# Patient Record
Sex: MALE | Race: WHITE | NOT HISPANIC OR LATINO | Employment: UNEMPLOYED | URBAN - METROPOLITAN AREA
[De-identification: names, ages, dates, MRNs, and addresses within clinical notes are randomized per-mention and may not be internally consistent; named-entity substitution may affect disease eponyms.]

---

## 2020-10-01 ENCOUNTER — OFFICE VISIT (OUTPATIENT)
Dept: FAMILY MEDICINE | Facility: CLINIC | Age: 40
End: 2020-10-01
Payer: MEDICAID

## 2020-10-01 VITALS
WEIGHT: 178.5 LBS | TEMPERATURE: 99 F | DIASTOLIC BLOOD PRESSURE: 76 MMHG | HEART RATE: 68 BPM | OXYGEN SATURATION: 98 % | BODY MASS INDEX: 27.05 KG/M2 | HEIGHT: 68 IN | SYSTOLIC BLOOD PRESSURE: 138 MMHG

## 2020-10-01 DIAGNOSIS — F25.9 CHRONIC SCHIZOAFFECTIVE DISORDER WITH ACUTE EXACERBATION: ICD-10-CM

## 2020-10-01 DIAGNOSIS — G24.02 DRUG INDUCED ACUTE DYSTONIA: ICD-10-CM

## 2020-10-01 DIAGNOSIS — F31.9 BIPOLAR AFFECTIVE DISORDER, REMISSION STATUS UNSPECIFIED: Primary | ICD-10-CM

## 2020-10-01 DIAGNOSIS — Z23 ENCOUNTER FOR IMMUNIZATION: ICD-10-CM

## 2020-10-01 DIAGNOSIS — F19.959 DRUG-INDUCED PSYCHOTIC DISORDER WITH COMPLICATION: ICD-10-CM

## 2020-10-01 DIAGNOSIS — F19.959 PSYCHOACTIVE SUBSTANCE-INDUCED PSYCHOSIS: ICD-10-CM

## 2020-10-01 PROCEDURE — 90686 IIV4 VACC NO PRSV 0.5 ML IM: CPT | Mod: S$GLB,,, | Performed by: NURSE PRACTITIONER

## 2020-10-01 PROCEDURE — 90686 FLU VACCINE (QUAD) GREATER THAN OR EQUAL TO 3YO PRESERVATIVE FREE IM: ICD-10-PCS | Mod: S$GLB,,, | Performed by: NURSE PRACTITIONER

## 2020-10-01 PROCEDURE — 90471 IMMUNIZATION ADMIN: CPT | Mod: S$GLB,,, | Performed by: NURSE PRACTITIONER

## 2020-10-01 PROCEDURE — 90471 FLU VACCINE (QUAD) GREATER THAN OR EQUAL TO 3YO PRESERVATIVE FREE IM: ICD-10-PCS | Mod: S$GLB,,, | Performed by: NURSE PRACTITIONER

## 2020-10-01 PROCEDURE — 99204 PR OFFICE/OUTPT VISIT, NEW, LEVL IV, 45-59 MIN: ICD-10-PCS | Mod: 25,S$GLB,, | Performed by: NURSE PRACTITIONER

## 2020-10-01 PROCEDURE — 99204 OFFICE O/P NEW MOD 45 MIN: CPT | Mod: 25,S$GLB,, | Performed by: NURSE PRACTITIONER

## 2020-10-01 RX ORDER — BUPROPION HYDROCHLORIDE 300 MG/1
300 TABLET ORAL DAILY
Qty: 30 TABLET | Refills: 5 | Status: SHIPPED | OUTPATIENT
Start: 2020-10-01 | End: 2020-12-14

## 2020-10-01 RX ORDER — ARIPIPRAZOLE 15 MG/1
5 TABLET ORAL DAILY
COMMUNITY
End: 2021-07-13

## 2020-10-01 RX ORDER — BUPROPION HYDROCHLORIDE 300 MG/1
300 TABLET ORAL DAILY
COMMUNITY
End: 2020-10-01 | Stop reason: SDUPTHER

## 2020-10-01 RX ORDER — GABAPENTIN 300 MG/1
600 CAPSULE ORAL 3 TIMES DAILY
COMMUNITY
End: 2022-12-13 | Stop reason: SDUPTHER

## 2020-10-01 RX ORDER — TRAZODONE HYDROCHLORIDE 100 MG/1
100 TABLET ORAL NIGHTLY
COMMUNITY
End: 2022-12-13 | Stop reason: SDUPTHER

## 2020-10-01 RX ORDER — ESCITALOPRAM OXALATE 20 MG/1
20 TABLET ORAL DAILY
COMMUNITY
End: 2021-07-13

## 2020-10-01 NOTE — PROGRESS NOTES
"Clinic Note  10/1/2020      Subjective:       Patient ID:  Filiberto is a 40 y.o. male being seen in office today to establish care.       Chief Complaint: Establish Care    Filiberto is here today with his mother at side to establish care. She is the primary historian during the visit.  On March 31, 2020 the patient presented to [facility] via EMS.  These records were provided,  reviewed, and discussed at this encounter.  He was picked up via EMS after receiving telephone reports indicating the patient was removing his clothing in the parking lot of [employer] on [address].  While in the emergency room it was reported the patient verbalized smoking "Mojo", taking 3 300 mg Wellbutrin XL, and an unknown amount of Xanax.  The patient was placed under a PEC commitment and kept on a 24 hr hold for overdosing on Wellbutrin.  Upon medical clearance, the patient admitted to [facility] for treatment.  His urine drug screen was positive for cocaine, benzodiazepines, and amphetamines.  He was then admitted to [facility] in [place], [place].  Since that time he has also been admitted to [facility] and was discharged on July 16, 2020.  His mother states he has not been the same since she picked him up.  He now has multiple spastic movements, tics, and is not compliant with taking medications.  She has found all of his pills hidden in his dresser drawer after he admitted he took them.  Meds prescribed at discharge from mental health facility include:  Abilify 15 mg, gabapentin 300 mg, Wellbutrin 300 mg XL, Lexapro 20 mg, and trazodone 100 mg.  Today mother states he only needs a refill on Wellbutrin.    She admits he has had a substance abuse problem since he was 18 years old and has been in and out of various treatment and mental health facilities.  Prior to his ER admission in March he was in a sobriety home and working independently, but she feels he was still getting drugs in this home.  " Now he is living with her and she is trying to get him reestablished with psych and mental health care.  He does not drive but he is fully communicative, dresses, and does ADLs independently.     Wellness labs done in April a [facility] provided for review at this visit:  CBC, lipids, A1C all WNL.  Pt is agreeable to Flu shot today      Family History   Problem Relation Age of Onset    Breast cancer Mother     Depression Mother     Alcohol abuse Father      Social History     Socioeconomic History    Marital status: Single     Spouse name: Not on file    Number of children: Not on file    Years of education: Not on file    Highest education level: Not on file   Occupational History    Not on file   Social Needs    Financial resource strain: Not on file    Food insecurity     Worry: Not on file     Inability: Not on file    Transportation needs     Medical: Not on file     Non-medical: Not on file   Tobacco Use    Smoking status: Former Smoker     Types: Cigars    Smokeless tobacco: Never Used   Substance and Sexual Activity    Alcohol use: Never     Frequency: Never    Drug use: Never    Sexual activity: Not on file   Lifestyle    Physical activity     Days per week: Not on file     Minutes per session: Not on file    Stress: Not on file   Relationships    Social connections     Talks on phone: Not on file     Gets together: Not on file     Attends [employer] service: Not on file     Active member of club or organization: Not on file     Attends meetings of clubs or organizations: Not on file     Relationship status: Not on file   Other Topics Concern    Not on file   Social History Narrative    Not on file     Past Surgical History:   Procedure Laterality Date    HERNIA REPAIR       Social History     Substance and Sexual Activity   Alcohol Use Never    Frequency: Never     Patient has no known allergies.  Medication List with Changes/Refills   Current Medications     "ARIPIPRAZOLE (ABILIFY) 15 MG TAB    Take 5 mg by mouth once daily.    ESCITALOPRAM OXALATE (LEXAPRO) 20 MG TABLET    Take 20 mg by mouth once daily.    GABAPENTIN (NEURONTIN) 300 MG CAPSULE    Take 300 mg by mouth 3 (three) times daily.    TRAZODONE (DESYREL) 100 MG TABLET    Take 100 mg by mouth every evening.   Changed and/or Refilled Medications    Modified Medication Previous Medication    BUPROPION (WELLBUTRIN XL) 300 MG 24 HR TABLET buPROPion (WELLBUTRIN XL) 300 MG 24 hr tablet       Take 1 tablet (300 mg total) by mouth once daily.    Take 300 mg by mouth once daily.       Review of Systems   Constitutional: Negative for appetite change, diaphoresis, fatigue and fever.   HENT: Negative for congestion, ear pain, postnasal drip, rhinorrhea, sinus pressure, sinus pain and sore throat.    Respiratory: Negative for cough, shortness of breath, wheezing and stridor.    Cardiovascular: Negative for chest pain, palpitations and leg swelling.   Gastrointestinal: Negative for abdominal pain, blood in stool, constipation, diarrhea, nausea and vomiting.   Genitourinary: Negative for flank pain, frequency, hematuria and urgency.   Musculoskeletal: Negative for gait problem, joint swelling and myalgias.   Skin: Negative for color change and rash.   Neurological: Negative for dizziness, tremors, seizures, speech difficulty, weakness, numbness and headaches.   Psychiatric/Behavioral: Positive for agitation, behavioral problems, confusion and hallucinations. Negative for decreased concentration, dysphoric mood, self-injury, sleep disturbance and suicidal ideas. The patient is not nervous/anxious.               /76 (BP Location: Right arm, Patient Position: Sitting, BP Method: Large (Manual))   Pulse 68   Temp 98.6 °F (37 °C) (Oral)   Ht 5' 8" (1.727 m)   Wt 81 kg (178 lb 8 oz)   SpO2 98%   BMI 27.14 kg/m²   Estimated body mass index is 27.14 kg/m² as calculated from the following:    Height as of this encounter: 5' " "8" (1.727 m).    Weight as of this encounter: 81 kg (178 lb 8 oz).    Objective:        Physical Exam  Vitals signs and nursing note reviewed.   Constitutional:       Appearance: He is well-developed.   HENT:      Head: Normocephalic and atraumatic.      Nose: Nose normal.      Mouth/Throat:      Pharynx: Uvula midline. No oropharyngeal exudate or posterior oropharyngeal erythema.   Eyes:      Conjunctiva/sclera: Conjunctivae normal.      Pupils: Pupils are equal, round, and reactive to light.   Neck:      Musculoskeletal: Full passive range of motion without pain, normal range of motion and neck supple.      Thyroid: No thyroid mass or thyromegaly.      Vascular: No carotid bruit or JVD.      Trachea: Trachea normal.   Cardiovascular:      Rate and Rhythm: Normal rate and regular rhythm.      Heart sounds: No murmur. No friction rub. No gallop.    Pulmonary:      Effort: Pulmonary effort is normal. No respiratory distress.      Breath sounds: Normal breath sounds. No stridor. No wheezing, rhonchi or rales.   Abdominal:      General: Bowel sounds are normal. There is no distension or abdominal bruit.      Palpations: Abdomen is soft. There is no mass.      Tenderness: There is no abdominal tenderness.   Musculoskeletal: Normal range of motion.   Lymphadenopathy:      Cervical: No cervical adenopathy.   Skin:     General: Skin is warm and dry.      Capillary Refill: Capillary refill takes less than 2 seconds.      Findings: No rash.   Neurological:      Mental Status: He is alert. He is disoriented and confused.      Cranial Nerves: No cranial nerve deficit.      Sensory: No sensory deficit.      Gait: Gait is intact.      Comments: Severe dystonia and multiple uncontrollable tics throughout the visit.     Psychiatric:         Attention and Perception: He is inattentive. He does not perceive visual hallucinations.         Mood and Affect: Mood and affect normal. Mood is not anxious or depressed.         Speech: " Speech normal.         Behavior: Behavior normal. Behavior is cooperative.         Thought Content: Thought content normal. Thought content does not include suicidal ideation. Thought content does not include homicidal or suicidal plan.         Cognition and Memory: Cognition is impaired. Memory is impaired. He exhibits impaired recent memory and impaired remote memory.         Judgment: Judgment is impulsive.            Assessment and Plan:         Evaristo was seen today for establish care.    Diagnoses and all orders for this visit:    Bipolar affective disorder, remission status unspecified  -     Ambulatory referral/consult to Psychiatry; Future  -     buPROPion (WELLBUTRIN XL) 300 MG 24 hr tablet; Take 1 tablet (300 mg total) by mouth once daily.    Chronic schizoaffective disorder with acute exacerbation  -     Ambulatory referral/consult to Psychiatry; Future    Drug-induced psychotic disorder with complication  -     Ambulatory referral/consult to Psychiatry; Future    Drug induced acute dystonia  -     Ambulatory referral/consult to Neurology; Future    Psychoactive substance-induced psychosis  -     Ambulatory referral/consult to Neurology; Future    Encounter for immunization  -     Influenza - Quadrivalent *Preferred* (6 months+) (PF)    During the visit Filiberto's speech is normal in rate, volume, and he is coherent.  Language skills are intact.  He is fully communicative and casually dressed.  His mood is entirely normal with minimal signs of depression or mood elevation.  At today's visit he denies any delusions, bizarre behaviors, or other indicators of psychotic process.  He does have severe dystonia and multiple uncontrollable tics throughout the visit.  Mother is agreeable to re-establish care with Psych and Neuro referral. Pt was informed if they have not received a phone call from provider referred to in one week to call office so that we can follow-up.  Patient and mother verbalized understanding  and agreed to treatment and plan of care.      Follow up:   Follow up in about 6 months (around 4/1/2021), or sooner if needed.            Paz Garcia NP

## 2020-12-14 ENCOUNTER — TELEPHONE (OUTPATIENT)
Dept: FAMILY MEDICINE | Facility: CLINIC | Age: 40
End: 2020-12-14

## 2020-12-14 NOTE — TELEPHONE ENCOUNTER
----- Message from Bing Mccarthy sent at 12/14/2020 10:51 AM CST -----  Regarding: patient prescription  Contact: [facility]  Would like to consult with nurse in regards to patient's Wellbutrin. Pharmacy is stating that a patient's family member wanted to stop the medication for patient being that they feel as if patient is abusing the medication. Pharmacy would like to know if they need to discontinue the medication. Please call back at [phone]

## 2020-12-22 ENCOUNTER — TELEPHONE (OUTPATIENT)
Dept: FAMILY MEDICINE | Facility: CLINIC | Age: 40
End: 2020-12-22
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
## 2021-01-05 ENCOUNTER — OFFICE VISIT (OUTPATIENT)
Dept: FAMILY MEDICINE | Facility: CLINIC | Age: 41
End: 2021-01-05
Payer: MEDICAID

## 2021-01-05 VITALS
DIASTOLIC BLOOD PRESSURE: 75 MMHG | BODY MASS INDEX: 25.61 KG/M2 | WEIGHT: 169 LBS | HEART RATE: 69 BPM | SYSTOLIC BLOOD PRESSURE: 129 MMHG | HEIGHT: 68 IN | OXYGEN SATURATION: 98 %

## 2021-01-05 DIAGNOSIS — F19.10 SUBSTANCE ABUSE: Primary | ICD-10-CM

## 2021-01-05 PROCEDURE — 99212 OFFICE O/P EST SF 10 MIN: CPT | Mod: S$GLB,,, | Performed by: FAMILY MEDICINE

## 2021-01-05 PROCEDURE — 99212 PR OFFICE/OUTPT VISIT, EST, LEVL II, 10-19 MIN: ICD-10-PCS | Mod: S$GLB,,, | Performed by: FAMILY MEDICINE

## 2021-01-05 RX ORDER — LITHIUM CARBONATE 300 MG/1
300 CAPSULE ORAL
COMMUNITY
End: 2021-07-13

## 2021-07-13 ENCOUNTER — OFFICE VISIT (OUTPATIENT)
Dept: FAMILY MEDICINE | Facility: CLINIC | Age: 41
End: 2021-07-13
Payer: MEDICAID

## 2021-07-13 VITALS
TEMPERATURE: 99 F | BODY MASS INDEX: 27.01 KG/M2 | HEART RATE: 74 BPM | RESPIRATION RATE: 18 BRPM | DIASTOLIC BLOOD PRESSURE: 91 MMHG | OXYGEN SATURATION: 97 % | WEIGHT: 178.19 LBS | HEIGHT: 68 IN | SYSTOLIC BLOOD PRESSURE: 123 MMHG

## 2021-07-13 DIAGNOSIS — Z13.29 SCREENING FOR ENDOCRINE, NUTRITIONAL, METABOLIC AND IMMUNITY DISORDER: ICD-10-CM

## 2021-07-13 DIAGNOSIS — Z13.228 SCREENING FOR ENDOCRINE, NUTRITIONAL, METABOLIC AND IMMUNITY DISORDER: ICD-10-CM

## 2021-07-13 DIAGNOSIS — Z13.0 SCREENING FOR ENDOCRINE, NUTRITIONAL, METABOLIC AND IMMUNITY DISORDER: ICD-10-CM

## 2021-07-13 DIAGNOSIS — Z00.00 ROUTINE ADULT HEALTH MAINTENANCE: ICD-10-CM

## 2021-07-13 DIAGNOSIS — Z76.89 ENCOUNTER TO ESTABLISH CARE: Primary | ICD-10-CM

## 2021-07-13 DIAGNOSIS — Z13.21 SCREENING FOR ENDOCRINE, NUTRITIONAL, METABOLIC AND IMMUNITY DISORDER: ICD-10-CM

## 2021-07-13 DIAGNOSIS — Z11.59 ENCOUNTER FOR SCREENING FOR OTHER VIRAL DISEASES: ICD-10-CM

## 2021-07-13 LAB
ABS NRBC COUNT: 0 X 10 3/UL (ref 0–0.01)
ABSOLUTE BASOPHIL: 0.07 X 10 3/UL (ref 0–0.22)
ABSOLUTE EOSINOPHIL: 0.33 X 10 3/UL (ref 0.04–0.54)
ABSOLUTE IMMATURE GRAN: 0.02 X 10 3/UL (ref 0–0.04)
ABSOLUTE LYMPHOCYTE: 1.78 X 10 3/UL (ref 0.86–4.75)
ABSOLUTE MONOCYTE: 0.59 X 10 3/UL (ref 0.22–1.08)
ALBUMIN SERPL-MCNC: 4.8 G/DL (ref 3.5–5.2)
ALBUMIN/GLOB SERPL ELPH: 1.7 {RATIO} (ref 1–2.7)
ALP ISOS SERPL LEV INH-CCNC: 55 U/L (ref 40–130)
ALT (SGPT): 11 U/L (ref 0–41)
ANION GAP SERPL CALC-SCNC: 9 MMOL/L (ref 8–17)
AST SERPL-CCNC: 15 U/L (ref 0–40)
BASOPHILS NFR BLD: 1 % (ref 0.2–1.2)
BILIRUBIN, TOTAL: 0.21 MG/DL (ref 0–1.2)
BUN/CREAT SERPL: 12.9 (ref 6–20)
CALCIUM SERPL-MCNC: 10.1 MG/DL (ref 8.6–10.2)
CARBON DIOXIDE, CO2: 29 MMOL/L (ref 22–29)
CHLORIDE: 103 MMOL/L (ref 98–107)
CHOLEST SERPL-MSCNC: 206 MG/DL (ref 100–200)
CREAT SERPL-MCNC: 1.25 MG/DL (ref 0.7–1.2)
EOSINOPHIL NFR BLD: 4.7 % (ref 0.7–7)
GFR ESTIMATION: 64.3
GLOBULIN: 2.9 G/DL (ref 1.5–4.5)
GLUCOSE: 92 MG/DL (ref 74–106)
HCT VFR BLD AUTO: 50.8 % (ref 42–52)
HCV IGG SERPL QL IA: NONREACTIVE
HDLC SERPL-MCNC: 57 MG/DL
HGB BLD-MCNC: 18.1 G/DL (ref 14–18)
HIV 1+2 AB+HIV1 P24 AG SERPL QL IA: NONREACTIVE
IMMATURE GRANULOCYTES: 0.3 % (ref 0–0.5)
LDL/HDL RATIO: 2.4 (ref 1–3)
LDLC SERPL CALC-MCNC: 135.2 MG/DL (ref 0–100)
LYMPHOCYTES NFR BLD: 25.3 % (ref 19.3–53.1)
MCH RBC QN AUTO: 30.7 PG (ref 27–32)
MCHC RBC AUTO-ENTMCNC: 35.6 G/DL (ref 32–36)
MCV RBC AUTO: 86.1 FL (ref 80–94)
MONOCYTES NFR BLD: 8.4 % (ref 4.7–12.5)
NEUTROPHILS # BLD AUTO: 4.25 X 10 3/UL (ref 2.15–7.56)
NEUTROPHILS NFR BLD: 60.3 % (ref 34–71.1)
NUCLEATED RED BLOOD CELLS: 0 /100 WBC (ref 0–0.2)
PLATELET # BLD AUTO: 267 X 10 3/UL (ref 135–400)
POTASSIUM: 4.8 MMOL/L (ref 3.5–5.1)
PROT SNV-MCNC: 7.7 G/DL (ref 6.4–8.3)
RBC # BLD AUTO: 5.9 X 10 6/UL (ref 4.7–6.1)
RDW-SD: 35.8 FL (ref 37–54)
SODIUM: 141 MMOL/L (ref 136–145)
TRIGL SERPL-MCNC: 69 MG/DL (ref 0–150)
TSH SERPL DL<=0.005 MIU/L-ACNC: 1.78 UIU/ML (ref 0.27–4.2)
UREA NITROGEN (BUN): 16.1 MG/DL (ref 6–20)
WBC # BLD: 7.04 X 10 3/UL (ref 4.3–10.8)

## 2021-07-13 PROCEDURE — 99395 PREV VISIT EST AGE 18-39: CPT | Mod: S$GLB,,, | Performed by: OBSTETRICS & GYNECOLOGY

## 2021-07-13 PROCEDURE — 99395 PR PREVENTIVE VISIT,EST,18-39: ICD-10-PCS | Mod: S$GLB,,, | Performed by: OBSTETRICS & GYNECOLOGY

## 2021-07-13 RX ORDER — OLANZAPINE 20 MG/1
1.5 TABLET ORAL NIGHTLY
COMMUNITY
Start: 2021-05-26 | End: 2023-08-09

## 2021-07-13 RX ORDER — FLUPHENAZINE DECANOATE 25 MG/ML
50 INJECTION, SOLUTION INTRAMUSCULAR; SUBCUTANEOUS
COMMUNITY
End: 2023-08-09

## 2021-08-04 ENCOUNTER — TELEPHONE (OUTPATIENT)
Dept: FAMILY MEDICINE | Facility: CLINIC | Age: 41
End: 2021-08-04

## 2021-08-04 DIAGNOSIS — R79.89 ELEVATED SERUM CREATININE: Primary | ICD-10-CM

## 2021-08-13 ENCOUNTER — TELEPHONE (OUTPATIENT)
Dept: FAMILY MEDICINE | Facility: CLINIC | Age: 41
End: 2021-08-13

## 2021-11-09 ENCOUNTER — OFFICE VISIT (OUTPATIENT)
Dept: FAMILY MEDICINE | Facility: CLINIC | Age: 41
End: 2021-11-09
Payer: MEDICAID

## 2021-11-09 VITALS
DIASTOLIC BLOOD PRESSURE: 85 MMHG | HEART RATE: 78 BPM | TEMPERATURE: 98 F | HEIGHT: 68 IN | WEIGHT: 188.25 LBS | OXYGEN SATURATION: 98 % | SYSTOLIC BLOOD PRESSURE: 143 MMHG | BODY MASS INDEX: 28.53 KG/M2

## 2021-11-09 DIAGNOSIS — Z72.0 TOBACCO ABUSE: Primary | ICD-10-CM

## 2021-11-09 PROCEDURE — 90471 IMMUNIZATION ADMIN: CPT | Mod: S$GLB,,, | Performed by: OBSTETRICS & GYNECOLOGY

## 2021-11-09 PROCEDURE — 90686 FLU VACCINE (QUAD) GREATER THAN OR EQUAL TO 3YO PRESERVATIVE FREE IM: ICD-10-PCS | Mod: S$GLB,,, | Performed by: OBSTETRICS & GYNECOLOGY

## 2021-11-09 PROCEDURE — 90471 FLU VACCINE (QUAD) GREATER THAN OR EQUAL TO 3YO PRESERVATIVE FREE IM: ICD-10-PCS | Mod: S$GLB,,, | Performed by: OBSTETRICS & GYNECOLOGY

## 2021-11-09 PROCEDURE — 99213 OFFICE O/P EST LOW 20 MIN: CPT | Mod: 25,S$GLB,, | Performed by: OBSTETRICS & GYNECOLOGY

## 2021-11-09 PROCEDURE — 99213 PR OFFICE/OUTPT VISIT, EST, LEVL III, 20-29 MIN: ICD-10-PCS | Mod: 25,S$GLB,, | Performed by: OBSTETRICS & GYNECOLOGY

## 2021-11-09 PROCEDURE — 90686 IIV4 VACC NO PRSV 0.5 ML IM: CPT | Mod: S$GLB,,, | Performed by: OBSTETRICS & GYNECOLOGY

## 2021-11-09 RX ORDER — VARENICLINE TARTRATE 0.5 (11)-1
KIT ORAL
Qty: 1 EACH | Refills: 0 | Status: SHIPPED | OUTPATIENT
Start: 2021-11-09 | End: 2022-10-19

## 2021-11-10 ENCOUNTER — TELEPHONE (OUTPATIENT)
Dept: FAMILY MEDICINE | Facility: CLINIC | Age: 41
End: 2021-11-10
Payer: MEDICAID

## 2021-11-15 ENCOUNTER — TELEPHONE (OUTPATIENT)
Dept: FAMILY MEDICINE | Facility: CLINIC | Age: 41
End: 2021-11-15
Payer: MEDICAID

## 2022-01-14 ENCOUNTER — TELEPHONE (OUTPATIENT)
Dept: FAMILY MEDICINE | Facility: CLINIC | Age: 42
End: 2022-01-14
Payer: MEDICAID

## 2022-01-14 NOTE — TELEPHONE ENCOUNTER
Informed patients mom that she should come in to see Summer for her concerns with her son. Patient verbalized understanding.

## 2022-01-14 NOTE — TELEPHONE ENCOUNTER
----- Message from Miguel Gutiérrez sent at 1/14/2022 10:33 AM CST -----  Contact: Chacha patient mother  Chacha patient mother called regarding patient mental health. Chacha ask if she can get a call from someone at [phone]. Thanks

## 2022-01-14 NOTE — TELEPHONE ENCOUNTER
----- Message from Janel Kelly MA sent at 1/14/2022 10:38 AM CST -----  Contact: Chacha patient mother    ----- Message -----  From: Miguel Gutiérrez  Sent: 1/14/2022  10:35 AM CST  To: Randell [facility] Staff    Chacha patient mother called regarding patient mental health. Chacha ask if she can get a call from someone at [phone]. Thanks

## 2022-01-20 ENCOUNTER — OFFICE VISIT (OUTPATIENT)
Dept: FAMILY MEDICINE | Facility: CLINIC | Age: 42
End: 2022-01-20
Payer: MEDICAID

## 2022-01-20 VITALS
SYSTOLIC BLOOD PRESSURE: 121 MMHG | OXYGEN SATURATION: 98 % | BODY MASS INDEX: 27.74 KG/M2 | DIASTOLIC BLOOD PRESSURE: 80 MMHG | HEART RATE: 102 BPM | RESPIRATION RATE: 16 BRPM | HEIGHT: 68 IN | WEIGHT: 183 LBS

## 2022-01-20 DIAGNOSIS — Z09 HOSPITAL DISCHARGE FOLLOW-UP: Primary | ICD-10-CM

## 2022-01-20 PROCEDURE — 3008F BODY MASS INDEX DOCD: CPT | Mod: CPTII,S$GLB,, | Performed by: OBSTETRICS & GYNECOLOGY

## 2022-01-20 PROCEDURE — 3008F PR BODY MASS INDEX (BMI) DOCUMENTED: ICD-10-PCS | Mod: CPTII,S$GLB,, | Performed by: OBSTETRICS & GYNECOLOGY

## 2022-01-20 PROCEDURE — 99213 OFFICE O/P EST LOW 20 MIN: CPT | Mod: S$GLB,,, | Performed by: OBSTETRICS & GYNECOLOGY

## 2022-01-20 PROCEDURE — 1160F RVW MEDS BY RX/DR IN RCRD: CPT | Mod: CPTII,S$GLB,, | Performed by: OBSTETRICS & GYNECOLOGY

## 2022-01-20 PROCEDURE — 3074F SYST BP LT 130 MM HG: CPT | Mod: CPTII,S$GLB,, | Performed by: OBSTETRICS & GYNECOLOGY

## 2022-01-20 PROCEDURE — 3079F PR MOST RECENT DIASTOLIC BLOOD PRESSURE 80-89 MM HG: ICD-10-PCS | Mod: CPTII,S$GLB,, | Performed by: OBSTETRICS & GYNECOLOGY

## 2022-01-20 PROCEDURE — 1159F PR MEDICATION LIST DOCUMENTED IN MEDICAL RECORD: ICD-10-PCS | Mod: CPTII,S$GLB,, | Performed by: OBSTETRICS & GYNECOLOGY

## 2022-01-20 PROCEDURE — 99213 PR OFFICE/OUTPT VISIT, EST, LEVL III, 20-29 MIN: ICD-10-PCS | Mod: S$GLB,,, | Performed by: OBSTETRICS & GYNECOLOGY

## 2022-01-20 PROCEDURE — 1159F MED LIST DOCD IN RCRD: CPT | Mod: CPTII,S$GLB,, | Performed by: OBSTETRICS & GYNECOLOGY

## 2022-01-20 PROCEDURE — 3074F PR MOST RECENT SYSTOLIC BLOOD PRESSURE < 130 MM HG: ICD-10-PCS | Mod: CPTII,S$GLB,, | Performed by: OBSTETRICS & GYNECOLOGY

## 2022-01-20 PROCEDURE — 3079F DIAST BP 80-89 MM HG: CPT | Mod: CPTII,S$GLB,, | Performed by: OBSTETRICS & GYNECOLOGY

## 2022-01-20 PROCEDURE — 1160F PR REVIEW ALL MEDS BY PRESCRIBER/CLIN PHARMACIST DOCUMENTED: ICD-10-PCS | Mod: CPTII,S$GLB,, | Performed by: OBSTETRICS & GYNECOLOGY

## 2022-01-20 NOTE — PROGRESS NOTES
Subjective:   Patient ID: Evaristo Crooks is a 41 y.o. male who presents for evaluation today.    Chief Complaint:  Follow-up (D/C [facility] on Jan. 3rd. Very depressed lately. )    History of Present Illness  HPI   Went to ER on 12/27. Was admitted to [facility] for paranoia. Has a history of schizophrenia. Had medications adjusted and doing well for the most part. Sees psych regularly with in home visits. Accompanied by mother at today's appointment.     FAMILY HISTORY  Family History   Problem Relation Age of Onset    Breast cancer Mother     Depression Mother     Alcohol abuse Father      SOCIAL HISTORY  Social History     Tobacco Use   Smoking Status Former Smoker    Types: Cigars   Smokeless Tobacco Never Used   ,   Social History     Substance and Sexual Activity   Alcohol Use Never     MEDICATIONS  Outpatient Medications Marked as Taking for the 1/20/22 encounter (Office Visit) with Summer Mejias NP   Medication Sig Dispense Refill    fluPHENAZine decanoate (PROLIXIN) 25 mg/mL injection Inject 50 mg into the muscle every 30 days.      gabapentin (NEURONTIN) 300 MG capsule Take 600 mg by mouth 3 (three) times daily.      traZODone (DESYREL) 100 MG tablet Take 100 mg by mouth every evening.       Review of Systems   Review of Systems   Constitutional: Negative for activity change, appetite change, fever and unexpected weight change.   HENT: Negative for dental problem, hearing loss, sneezing, sore throat, trouble swallowing and voice change.    Eyes: Negative for visual disturbance.   Respiratory: Negative for cough, choking, chest tightness and shortness of breath.    Cardiovascular: Negative for chest pain, palpitations, leg swelling and claudication.   Gastrointestinal: Negative for abdominal pain, blood in stool, change in bowel habit, nausea, vomiting, fecal incontinence and change in bowel habit.   Endocrine: Negative for polydipsia, polyphagia and polyuria.   Genitourinary: Negative  "for decreased urine volume, dysuria and hematuria.   Musculoskeletal: Negative for gait problem, neck pain and neck stiffness.   Integumentary:  Negative for color change, pallor, rash, wound and mole/lesion.   Allergic/Immunologic: Negative for frequent infections.   Neurological: Negative for dizziness, vertigo, seizures, syncope, speech difficulty, disturbances in coordination and coordination difficulties.   Hematological: Negative for adenopathy. Does not bruise/bleed easily.   Psychiatric/Behavioral: Negative for behavioral problems, confusion, hallucinations, self-injury, sleep disturbance and suicidal ideas.         Objective:    VITALS  BP Readings from Last 1 Encounters:   01/20/22 121/80   Weight: 83 kg (183 lb)   Height: 5' 8" (172.7 cm)   BMI Readings from Last 1 Encounters:   01/20/22 27.83 kg/m²       Physical Exam  Vitals and nursing note reviewed.   Constitutional:       General: He is not in acute distress.     Appearance: Normal appearance. He is not ill-appearing, toxic-appearing or diaphoretic.   HENT:      Head: Normocephalic and atraumatic.      Right Ear: External ear normal.      Left Ear: External ear normal.      Nose: Nose normal.   Eyes:      General:         Right eye: No discharge.         Left eye: No discharge.   Neck:      Vascular: No carotid bruit.   Cardiovascular:      Rate and Rhythm: Normal rate and regular rhythm.      Heart sounds: Normal heart sounds. No murmur heard.  No friction rub. No gallop.    Pulmonary:      Effort: Pulmonary effort is normal. No respiratory distress.      Breath sounds: Normal breath sounds. No stridor. No wheezing, rhonchi or rales.   Chest:      Chest wall: No tenderness.   Musculoskeletal:         General: Normal range of motion.      Cervical back: Normal range of motion and neck supple.      Right lower leg: No edema.      Left lower leg: No edema.   Skin:     General: Skin is warm and dry.      Coloration: Skin is not jaundiced or pale. "   Neurological:      General: No focal deficit present.      Mental Status: He is alert and oriented to person, place, and time.      Gait: Gait normal.   Psychiatric:         Mood and Affect: Mood normal.         Behavior: Behavior normal.         Thought Content: Thought content normal.         Judgment: Judgment normal.         Assessment:      1. Hospital discharge follow-up       Plan:   Hospital discharge follow-up    Keep psych appointments. Follow up with psychiatrist for ongoing care and evaluation.     Patient instructed to contact the clinic should any questions or concerns arise prior to next office visit. Risks, benefits, and alternatives discussed with patient. Patient verbalized understanding of discussed plan of care. Asked patient if any further questions, answered no. Follow up as discussed or sooner PRN.

## 2022-03-29 ENCOUNTER — OFFICE VISIT (OUTPATIENT)
Dept: FAMILY MEDICINE | Facility: CLINIC | Age: 42
End: 2022-03-29
Payer: MEDICAID

## 2022-03-29 VITALS
TEMPERATURE: 98 F | OXYGEN SATURATION: 96 % | HEIGHT: 68 IN | BODY MASS INDEX: 26.98 KG/M2 | HEART RATE: 51 BPM | RESPIRATION RATE: 14 BRPM | DIASTOLIC BLOOD PRESSURE: 72 MMHG | WEIGHT: 178 LBS | SYSTOLIC BLOOD PRESSURE: 108 MMHG

## 2022-03-29 DIAGNOSIS — R79.89 ELEVATED SERUM CREATININE: ICD-10-CM

## 2022-03-29 DIAGNOSIS — E78.41 ELEVATED LIPOPROTEIN(A): Primary | ICD-10-CM

## 2022-03-29 PROCEDURE — 3074F PR MOST RECENT SYSTOLIC BLOOD PRESSURE < 130 MM HG: ICD-10-PCS | Mod: CPTII,S$GLB,, | Performed by: OBSTETRICS & GYNECOLOGY

## 2022-03-29 PROCEDURE — 3078F PR MOST RECENT DIASTOLIC BLOOD PRESSURE < 80 MM HG: ICD-10-PCS | Mod: CPTII,S$GLB,, | Performed by: OBSTETRICS & GYNECOLOGY

## 2022-03-29 PROCEDURE — 99213 PR OFFICE/OUTPT VISIT, EST, LEVL III, 20-29 MIN: ICD-10-PCS | Mod: S$GLB,,, | Performed by: OBSTETRICS & GYNECOLOGY

## 2022-03-29 PROCEDURE — 1159F MED LIST DOCD IN RCRD: CPT | Mod: CPTII,S$GLB,, | Performed by: OBSTETRICS & GYNECOLOGY

## 2022-03-29 PROCEDURE — 99213 OFFICE O/P EST LOW 20 MIN: CPT | Mod: S$GLB,,, | Performed by: OBSTETRICS & GYNECOLOGY

## 2022-03-29 PROCEDURE — 3078F DIAST BP <80 MM HG: CPT | Mod: CPTII,S$GLB,, | Performed by: OBSTETRICS & GYNECOLOGY

## 2022-03-29 PROCEDURE — 3008F BODY MASS INDEX DOCD: CPT | Mod: CPTII,S$GLB,, | Performed by: OBSTETRICS & GYNECOLOGY

## 2022-03-29 PROCEDURE — 1160F RVW MEDS BY RX/DR IN RCRD: CPT | Mod: CPTII,S$GLB,, | Performed by: OBSTETRICS & GYNECOLOGY

## 2022-03-29 PROCEDURE — 3008F PR BODY MASS INDEX (BMI) DOCUMENTED: ICD-10-PCS | Mod: CPTII,S$GLB,, | Performed by: OBSTETRICS & GYNECOLOGY

## 2022-03-29 PROCEDURE — 1160F PR REVIEW ALL MEDS BY PRESCRIBER/CLIN PHARMACIST DOCUMENTED: ICD-10-PCS | Mod: CPTII,S$GLB,, | Performed by: OBSTETRICS & GYNECOLOGY

## 2022-03-29 PROCEDURE — 3074F SYST BP LT 130 MM HG: CPT | Mod: CPTII,S$GLB,, | Performed by: OBSTETRICS & GYNECOLOGY

## 2022-03-29 PROCEDURE — 1159F PR MEDICATION LIST DOCUMENTED IN MEDICAL RECORD: ICD-10-PCS | Mod: CPTII,S$GLB,, | Performed by: OBSTETRICS & GYNECOLOGY

## 2022-03-29 RX ORDER — FLUPHENAZINE HYDROCHLORIDE 10 MG/1
TABLET ORAL
COMMUNITY
Start: 2022-01-03 | End: 2023-08-09

## 2022-03-29 RX ORDER — DULOXETIN HYDROCHLORIDE 30 MG/1
30 CAPSULE, DELAYED RELEASE ORAL DAILY
COMMUNITY
End: 2022-12-13 | Stop reason: SDUPTHER

## 2022-03-29 NOTE — PROGRESS NOTES
Subjective:   Patient ID: Evaristo Crooks is a 41 y.o. male who presents for evaluation today.    Chief Complaint:  Annual Exam      History of Present Illness  HPI   Patient presents today for wellness exam. He is accompanied by his mother, Chacha. He is going to schedule to receive the Covid vaccine. He is due for a repeat lipid panel. He denies any complaints or concerns today. He sees Karla Sr for psychiatric care.    FAMILY HISTORY  Family History   Problem Relation Age of Onset    Breast cancer Mother     Depression Mother     Alcohol abuse Father      SOCIAL HISTORY  Social History     Tobacco Use   Smoking Status Former Smoker    Types: Cigars   Smokeless Tobacco Never Used   ,   Social History     Substance and Sexual Activity   Alcohol Use Never     MEDICATIONS  Outpatient Medications Marked as Taking for the 3/29/22 encounter (Office Visit) with Summer Mejias NP   Medication Sig Dispense Refill    DULoxetine (CYMBALTA) 30 MG capsule Take 30 mg by mouth once daily.      fluphenazine (PROLIXIN) 10 MG tablet       fluPHENAZine decanoate (PROLIXIN) 25 mg/mL injection Inject 50 mg into the muscle every 30 days.      gabapentin (NEURONTIN) 300 MG capsule Take 600 mg by mouth 3 (three) times daily.      OLANZapine (ZYPREXA) 20 MG tablet Take 1.5 tablets by mouth every evening.      traZODone (DESYREL) 100 MG tablet Take 100 mg by mouth every evening.       Review of Systems   Review of Systems   Constitutional: Negative for activity change, appetite change, fever and unexpected weight change.   HENT: Negative for dental problem, hearing loss, sneezing, sore throat, trouble swallowing and voice change.    Eyes: Negative for visual disturbance.   Respiratory: Negative for cough, choking, chest tightness and shortness of breath.    Cardiovascular: Negative for chest pain, palpitations, leg swelling and claudication.   Gastrointestinal: Negative for abdominal pain, blood in stool, change in bowel  "habit, nausea, vomiting, fecal incontinence and change in bowel habit.   Endocrine: Negative for polydipsia, polyphagia and polyuria.   Genitourinary: Negative for decreased urine volume, dysuria and hematuria.   Musculoskeletal: Negative for gait problem, neck pain and neck stiffness.   Integumentary:  Negative for color change, pallor, rash, wound and mole/lesion.   Allergic/Immunologic: Negative for frequent infections.   Neurological: Negative for dizziness, vertigo, seizures, syncope, speech difficulty, disturbances in coordination and coordination difficulties.   Hematological: Negative for adenopathy. Does not bruise/bleed easily.   Psychiatric/Behavioral: Negative for behavioral problems, confusion, hallucinations, self-injury, sleep disturbance and suicidal ideas.         Objective:    VITALS  BP Readings from Last 1 Encounters:   03/29/22 108/72   Weight: 80.7 kg (178 lb)   Height: 5' 8" (172.7 cm)   BMI Readings from Last 1 Encounters:   03/29/22 27.06 kg/m²       Physical Exam  Vitals and nursing note reviewed.   Constitutional:       General: He is not in acute distress.     Appearance: Normal appearance. He is not ill-appearing, toxic-appearing or diaphoretic.   HENT:      Head: Normocephalic and atraumatic.      Right Ear: External ear normal.      Left Ear: External ear normal.      Nose: Nose normal.   Eyes:      General:         Right eye: No discharge.         Left eye: No discharge.   Neck:      Vascular: No carotid bruit.   Cardiovascular:      Rate and Rhythm: Normal rate and regular rhythm.      Heart sounds: Normal heart sounds. No murmur heard.    No friction rub. No gallop.   Pulmonary:      Effort: Pulmonary effort is normal. No respiratory distress.      Breath sounds: Normal breath sounds. No stridor. No wheezing, rhonchi or rales.   Chest:      Chest wall: No tenderness.   Musculoskeletal:         General: Normal range of motion.      Cervical back: Normal range of motion and neck " supple.      Right lower leg: No edema.      Left lower leg: No edema.   Skin:     General: Skin is warm and dry.      Coloration: Skin is not jaundiced or pale.   Neurological:      General: No focal deficit present.      Mental Status: He is alert and oriented to person, place, and time.      Gait: Gait normal.   Psychiatric:         Mood and Affect: Mood normal.         Behavior: Behavior normal.         Thought Content: Thought content normal.         Judgment: Judgment normal.         Assessment:      1. Elevated lipoprotein(a)    2. Elevated serum creatinine       Plan:   Elevated lipoprotein(a)  -     Lipid panel; Future; Expected date: 03/29/2022    Elevated serum creatinine  -     Comprehensive Metabolic Panel      Patient instructed to contact the clinic should any questions or concerns arise prior to next office visit. Risks, benefits, and alternatives discussed with patient. Patient verbalized understanding of discussed plan of care. Asked patient if any further questions, answered no. Follow up as discussed or sooner PRN.

## 2022-05-11 ENCOUNTER — TELEPHONE (OUTPATIENT)
Dept: FAMILY MEDICINE | Facility: CLINIC | Age: 42
End: 2022-05-11

## 2022-05-11 NOTE — TELEPHONE ENCOUNTER
----- Message from Summer Mejias NP sent at 5/11/2022  9:09 AM CDT -----  Please call and inform pt labs were within normal limits.

## 2022-10-06 ENCOUNTER — TELEPHONE (OUTPATIENT)
Dept: FAMILY MEDICINE | Facility: CLINIC | Age: 42
End: 2022-10-06
Payer: MEDICARE

## 2022-10-06 DIAGNOSIS — F19.959 DRUG-INDUCED PSYCHOTIC DISORDER WITH COMPLICATION: ICD-10-CM

## 2022-10-06 DIAGNOSIS — F31.9 BIPOLAR AFFECTIVE DISORDER, REMISSION STATUS UNSPECIFIED: Primary | ICD-10-CM

## 2022-10-06 DIAGNOSIS — F25.9 CHRONIC SCHIZOAFFECTIVE DISORDER WITH ACUTE EXACERBATION: ICD-10-CM

## 2022-10-06 NOTE — TELEPHONE ENCOUNTER
Informed patients mom that I sent his referral to the [facility] psych. He has one in there but it was

## 2022-10-06 NOTE — TELEPHONE ENCOUNTER
----- Message from Miguel Gutiérrez sent at 10/6/2022  1:45 PM CDT -----  Contact: self  Pt called regarding a referral he need sent to [facility]. Please call the pt at [phone]

## 2022-10-07 DIAGNOSIS — F19.959 DRUG-INDUCED PSYCHOTIC DISORDER WITH COMPLICATION: ICD-10-CM

## 2022-10-07 DIAGNOSIS — F31.9 BIPOLAR AFFECTIVE DISORDER, REMISSION STATUS UNSPECIFIED: Primary | ICD-10-CM

## 2022-10-07 DIAGNOSIS — F25.9 CHRONIC SCHIZOAFFECTIVE DISORDER WITH ACUTE EXACERBATION: ICD-10-CM

## 2022-10-12 ENCOUNTER — TELEPHONE (OUTPATIENT)
Dept: FAMILY MEDICINE | Facility: CLINIC | Age: 42
End: 2022-10-12
Payer: MEDICARE

## 2022-10-12 NOTE — TELEPHONE ENCOUNTER
Informed patients mother that I am going to send over his office notes. Patients mother verbalized understanding

## 2022-10-12 NOTE — TELEPHONE ENCOUNTER
----- Message from Salma Nichols sent at 10/12/2022  2:19 PM CDT -----  Contact: Alejandra(mother)  Alejandra called to consult with nurse or staff regarding information that is needed. She states the patient is in the process of getting scheduled with [facility] and their office is needing the notes from the patients last visit with Summer. Alejandra wanted to speak with office regarding this and can be reached at [phone]. Thanks/MR

## 2022-10-19 ENCOUNTER — OFFICE VISIT (OUTPATIENT)
Dept: FAMILY MEDICINE | Facility: CLINIC | Age: 42
End: 2022-10-19
Payer: MEDICARE

## 2022-10-19 VITALS
OXYGEN SATURATION: 98 % | HEART RATE: 62 BPM | BODY MASS INDEX: 25.61 KG/M2 | TEMPERATURE: 98 F | WEIGHT: 169 LBS | SYSTOLIC BLOOD PRESSURE: 112 MMHG | HEIGHT: 68 IN | RESPIRATION RATE: 14 BRPM | DIASTOLIC BLOOD PRESSURE: 72 MMHG

## 2022-10-19 DIAGNOSIS — J30.2 SEASONAL ALLERGIES: ICD-10-CM

## 2022-10-19 DIAGNOSIS — F31.9 BIPOLAR AFFECTIVE DISORDER, REMISSION STATUS UNSPECIFIED: Primary | ICD-10-CM

## 2022-10-19 PROCEDURE — 3078F DIAST BP <80 MM HG: CPT | Mod: CPTII,S$GLB,, | Performed by: OBSTETRICS & GYNECOLOGY

## 2022-10-19 PROCEDURE — 99213 PR OFFICE/OUTPT VISIT, EST, LEVL III, 20-29 MIN: ICD-10-PCS | Mod: S$GLB,,, | Performed by: OBSTETRICS & GYNECOLOGY

## 2022-10-19 PROCEDURE — 99213 OFFICE O/P EST LOW 20 MIN: CPT | Mod: S$GLB,,, | Performed by: OBSTETRICS & GYNECOLOGY

## 2022-10-19 PROCEDURE — 1159F MED LIST DOCD IN RCRD: CPT | Mod: CPTII,S$GLB,, | Performed by: OBSTETRICS & GYNECOLOGY

## 2022-10-19 PROCEDURE — 3078F PR MOST RECENT DIASTOLIC BLOOD PRESSURE < 80 MM HG: ICD-10-PCS | Mod: CPTII,S$GLB,, | Performed by: OBSTETRICS & GYNECOLOGY

## 2022-10-19 PROCEDURE — 3074F PR MOST RECENT SYSTOLIC BLOOD PRESSURE < 130 MM HG: ICD-10-PCS | Mod: CPTII,S$GLB,, | Performed by: OBSTETRICS & GYNECOLOGY

## 2022-10-19 PROCEDURE — 1159F PR MEDICATION LIST DOCUMENTED IN MEDICAL RECORD: ICD-10-PCS | Mod: CPTII,S$GLB,, | Performed by: OBSTETRICS & GYNECOLOGY

## 2022-10-19 PROCEDURE — 3074F SYST BP LT 130 MM HG: CPT | Mod: CPTII,S$GLB,, | Performed by: OBSTETRICS & GYNECOLOGY

## 2022-10-19 RX ORDER — PROPRANOLOL HYDROCHLORIDE 60 MG/1
60 CAPSULE, EXTENDED RELEASE ORAL NIGHTLY
COMMUNITY
Start: 2022-10-04 | End: 2022-12-13 | Stop reason: SDUPTHER

## 2022-10-19 RX ORDER — FEXOFENADINE HCL 60 MG
60 TABLET ORAL DAILY
Qty: 90 TABLET | Refills: 1 | Status: SHIPPED | OUTPATIENT
Start: 2022-10-19 | End: 2023-08-09

## 2022-10-19 NOTE — PROGRESS NOTES
Subjective:   Patient ID: Evaristo Crooks is a 42 y.o. male who presents for evaluation today.    Chief Complaint:  Annual Exam    History of Present Illness  HPI  Patient presents today for annual exam. He recently had a stay at an inpatient psychiatric facility and they adjusted his medication. He is doing much better since that time. He denies complaints today. However, he had an insurance change and is needing referral to a new psychiatrist.     FAMILY HISTORY  Family History   Problem Relation Age of Onset    Breast cancer Mother     Depression Mother     Alcohol abuse Father      SOCIAL HISTORY  Social History     Tobacco Use   Smoking Status Former    Types: Cigars   Smokeless Tobacco Never   ,   Social History     Substance and Sexual Activity   Alcohol Use Never     MEDICATIONS  Outpatient Medications Marked as Taking for the 10/19/22 encounter (Office Visit) with Summer Mejias NP   Medication Sig Dispense Refill    diphenhydrAMINE (BENADRYL) 50 MG tablet Take 50 mg by mouth nightly as needed for Itching.      DULoxetine (CYMBALTA) 30 MG capsule Take 30 mg by mouth once daily.      gabapentin (NEURONTIN) 300 MG capsule Take 600 mg by mouth 3 (three) times daily.      OLANZapine (ZYPREXA) 20 MG tablet Take 1.5 tablets by mouth every evening.      propranoloL (INDERAL LA) 60 MG 24 hr capsule Take 60 mg by mouth every evening.      traZODone (DESYREL) 100 MG tablet Take 100 mg by mouth every evening.       Review of Systems   Review of Systems   Constitutional:  Negative for activity change, appetite change, fever and unexpected weight change.   HENT:  Negative for dental problem, hearing loss, sneezing, sore throat, trouble swallowing and voice change.    Eyes:  Negative for visual disturbance.   Respiratory:  Negative for cough, choking, chest tightness and shortness of breath.    Cardiovascular:  Negative for chest pain, palpitations, leg swelling and claudication.   Gastrointestinal:  Negative for  "abdominal pain, blood in stool, change in bowel habit, nausea, vomiting, fecal incontinence and change in bowel habit.   Endocrine: Negative for polydipsia, polyphagia and polyuria.   Genitourinary:  Negative for decreased urine volume, dysuria and hematuria.   Musculoskeletal:  Negative for gait problem, neck pain and neck stiffness.   Integumentary:  Negative for color change, pallor, rash, wound and mole/lesion.   Allergic/Immunologic: Negative for frequent infections.   Neurological:  Negative for dizziness, vertigo, seizures, syncope, speech difficulty, coordination difficulties and coordination difficulties.   Hematological:  Negative for adenopathy. Does not bruise/bleed easily.   Psychiatric/Behavioral:  Negative for behavioral problems, confusion, hallucinations, self-injury, sleep disturbance and suicidal ideas.        Objective:    VITALS  BP Readings from Last 1 Encounters:   10/19/22 112/72   Weight: 76.7 kg (169 lb)   Height: 5' 8" (172.7 cm)   BMI Readings from Last 1 Encounters:   10/19/22 25.70 kg/m²       Physical Exam  Vitals and nursing note reviewed.   Constitutional:       General: He is not in acute distress.     Appearance: Normal appearance. He is not ill-appearing, toxic-appearing or diaphoretic.   HENT:      Head: Normocephalic and atraumatic.      Right Ear: External ear normal.      Left Ear: External ear normal.      Nose: Nose normal.   Eyes:      General:         Right eye: No discharge.         Left eye: No discharge.   Neck:      Thyroid: No thyroid mass, thyromegaly or thyroid tenderness.      Vascular: No carotid bruit.   Cardiovascular:      Rate and Rhythm: Normal rate and regular rhythm.      Heart sounds: Normal heart sounds. No murmur heard.    No friction rub. No gallop.   Pulmonary:      Effort: Pulmonary effort is normal. No respiratory distress.      Breath sounds: Normal breath sounds. No stridor. No wheezing, rhonchi or rales.   Chest:      Chest wall: No tenderness. "   Musculoskeletal:         General: Normal range of motion.      Cervical back: Normal range of motion and neck supple.      Right lower leg: No edema.      Left lower leg: No edema.   Skin:     General: Skin is warm and dry.      Coloration: Skin is not jaundiced or pale.   Neurological:      General: No focal deficit present.      Mental Status: He is alert and oriented to person, place, and time.      Gait: Gait normal.   Psychiatric:         Mood and Affect: Mood normal.         Behavior: Behavior normal.         Thought Content: Thought content normal.         Judgment: Judgment normal.       Assessment:      1. Bipolar affective disorder, remission status unspecified    2. Seasonal allergies       Plan:   Bipolar affective disorder, remission status unspecified  -     Ambulatory referral/consult to Psychiatry; Future; Expected date: 10/26/2022  Referral faxed today. Patient instructed to contact our office if a call has not received from the facility to schedule an appointment within the next 2 weeks.     Seasonal allergies  -     fexofenadine (ALLEGRA) 60 MG tablet; Take 1 tablet (60 mg total) by mouth once daily.  Dispense: 90 tablet; Refill: 1    Patient instructed to contact the clinic should any questions or concerns arise prior to next office visit. Risks, benefits, and alternatives discussed with patient. Patient verbalized understanding of discussed plan of care. Asked patient if any further questions, answered no. Follow up as discussed or sooner PRN.

## 2022-12-13 NOTE — TELEPHONE ENCOUNTER
----- Message from Salma Nichols sent at 12/13/2022  4:27 PM CST -----  Contact: Chacha(mom)  Chacha called to consult with nurse or staff regarding the patients referral to mental health. She states she is not able to find anyone who will accept the patients insurance and wanted to speak with office regarding this. She can be reached at [phone]. Thanks/MR

## 2022-12-13 NOTE — TELEPHONE ENCOUNTER
Informed patient that I will call tomorrow to get a fax number so we can get him scheduled.  Patient verbalized understanding

## 2022-12-15 ENCOUNTER — TELEPHONE (OUTPATIENT)
Dept: FAMILY MEDICINE | Facility: CLINIC | Age: 42
End: 2022-12-15
Payer: MEDICARE

## 2022-12-15 NOTE — TELEPHONE ENCOUNTER
Informed patient that Summer is out and she will do her refill request on Monday. Patient verbalized understanding

## 2022-12-15 NOTE — TELEPHONE ENCOUNTER
----- Message from Duane Cruz sent at 12/15/2022 10:37 AM CST -----  Contact: pt mother - Chacha  Is calling to follow up on refill requests that were phoned in on yesterday/ pt mother can be reached at [phone]//steve/liza       [id number] [place] - [address] AT  &   [address]  [place] [address]  Phone: [phone] Fax: [phone]

## 2022-12-19 RX ORDER — TRAZODONE HYDROCHLORIDE 100 MG/1
100 TABLET ORAL NIGHTLY
Qty: 30 TABLET | Refills: 2 | Status: SHIPPED | OUTPATIENT
Start: 2022-12-19 | End: 2023-03-13

## 2022-12-19 RX ORDER — GABAPENTIN 300 MG/1
600 CAPSULE ORAL 3 TIMES DAILY
Qty: 180 CAPSULE | Refills: 1 | Status: SHIPPED | OUTPATIENT
Start: 2022-12-19 | End: 2023-02-13

## 2022-12-19 RX ORDER — DULOXETIN HYDROCHLORIDE 30 MG/1
30 CAPSULE, DELAYED RELEASE ORAL DAILY
Qty: 30 CAPSULE | Refills: 2 | Status: SHIPPED | OUTPATIENT
Start: 2022-12-19 | End: 2023-02-06 | Stop reason: SDUPTHER

## 2022-12-19 RX ORDER — PROPRANOLOL HYDROCHLORIDE 60 MG/1
60 CAPSULE, EXTENDED RELEASE ORAL NIGHTLY
Qty: 30 CAPSULE | Refills: 2 | Status: SHIPPED | OUTPATIENT
Start: 2022-12-19 | End: 2023-01-17

## 2023-01-24 RX ORDER — DULOXETIN HYDROCHLORIDE 30 MG/1
30 CAPSULE, DELAYED RELEASE ORAL DAILY
Qty: 30 CAPSULE | Refills: 2 | OUTPATIENT
Start: 2023-01-24

## 2023-01-25 ENCOUNTER — TELEPHONE (OUTPATIENT)
Dept: PRIMARY CARE CLINIC | Facility: CLINIC | Age: 43
End: 2023-01-25
Payer: MEDICARE

## 2023-01-25 NOTE — TELEPHONE ENCOUNTER
----- Message from Janel Kelly MA sent at 1/25/2023  1:17 PM CST -----  Regarding: FW: Medication  Contact: mother chen    ----- Message -----  From: Emily Herrera  Sent: 1/25/2023  11:36 AM CST  To: Randell [facility] Staff  Subject: Medication                                       Per phone call with patient she stated that the medication  instruction is wrong.  The medication is DULoxetine (CYMBALTA) 30 MG capsule and the dosages should be 60 mg and taken twice daily.  Please return call at [phone] (home).  Chacha stated that she needs the nurse to call her back because the medication wads denied.  Chacha indicated that if he needs to gets an appointment she can come today.    Thanks,  SJ

## 2023-02-06 RX ORDER — DULOXETIN HYDROCHLORIDE 30 MG/1
30 CAPSULE, DELAYED RELEASE ORAL DAILY
Qty: 30 CAPSULE | Refills: 2 | Status: SHIPPED | OUTPATIENT
Start: 2023-02-06

## 2023-04-05 ENCOUNTER — TELEPHONE (OUTPATIENT)
Dept: FAMILY MEDICINE | Facility: CLINIC | Age: 43
End: 2023-04-05

## 2023-07-18 ENCOUNTER — TELEPHONE (OUTPATIENT)
Dept: FAMILY MEDICINE | Facility: CLINIC | Age: 43
End: 2023-07-18

## 2023-07-18 NOTE — TELEPHONE ENCOUNTER
----- Message from Nadege Matthew sent at 7/18/2023  3:41 PM CDT -----  Contact: self  Type: Staff Message    Caller: Evaristo Bacon    Call Back Number: [phone]    Nature of the Call: Running late went to wrong building  Additional Information: na

## 2023-08-09 ENCOUNTER — OFFICE VISIT (OUTPATIENT)
Dept: FAMILY MEDICINE | Facility: CLINIC | Age: 43
End: 2023-08-09
Payer: MEDICARE

## 2023-08-09 VITALS
HEIGHT: 68 IN | HEART RATE: 55 BPM | SYSTOLIC BLOOD PRESSURE: 120 MMHG | DIASTOLIC BLOOD PRESSURE: 80 MMHG | BODY MASS INDEX: 28.7 KG/M2 | TEMPERATURE: 97 F | RESPIRATION RATE: 15 BRPM | WEIGHT: 189.38 LBS | OXYGEN SATURATION: 98 %

## 2023-08-09 DIAGNOSIS — N64.9 BREAST LESION: ICD-10-CM

## 2023-08-09 DIAGNOSIS — Z76.89 ENCOUNTER TO ESTABLISH CARE: Primary | ICD-10-CM

## 2023-08-09 DIAGNOSIS — Z79.899 OTHER LONG TERM (CURRENT) DRUG THERAPY: ICD-10-CM

## 2023-08-09 DIAGNOSIS — Z13.6 SCREENING FOR CARDIOVASCULAR CONDITION: ICD-10-CM

## 2023-08-09 DIAGNOSIS — Z91.09 ENVIRONMENTAL ALLERGIES: ICD-10-CM

## 2023-08-09 DIAGNOSIS — F19.10 SUBSTANCE ABUSE: ICD-10-CM

## 2023-08-09 DIAGNOSIS — F25.9 CHRONIC SCHIZOAFFECTIVE DISORDER WITH ACUTE EXACERBATION: ICD-10-CM

## 2023-08-09 LAB
ABS NRBC COUNT: 0 THOU/UL (ref 0–0.01)
ABSOLUTE BASOPHIL: 0.1 10*3/UL (ref 0–0.3)
ABSOLUTE EOSINOPHIL: 0.4 10*3/UL (ref 0–0.6)
ABSOLUTE IMMATURE GRAN: 0.03 THOU/UL (ref 0–0.03)
ABSOLUTE LYMPHOCYTE: 1.8 10*3/UL (ref 1.2–4)
ABSOLUTE MONOCYTE: 0.5 10*3/UL (ref 0.1–0.8)
ALBUMIN SERPL BCP-MCNC: 3.7 G/DL (ref 3.4–5)
ALP SERPL-CCNC: 43 U/L (ref 45–117)
ALT SERPL W P-5'-P-CCNC: 23 U/L (ref 16–61)
ANION GAP SERPL CALC-SCNC: 4 MMOL/L (ref 3–11)
AST SERPL-CCNC: 18 U/L (ref 15–37)
BASOPHILS NFR BLD: 1.2 % (ref 0–3)
BILIRUB SERPL-MCNC: 0.3 MG/DL (ref 0.2–1)
BUN SERPL-MCNC: 19 MG/DL (ref 7–18)
BUN/CREAT SERPL: 17.92 RATIO
CALCIUM SERPL-MCNC: 8.7 MG/DL (ref 8.5–10.1)
CHLORIDE SERPL-SCNC: 108 MMOL/L (ref 98–107)
CHOLEST SERPL-MSCNC: 197 MG/DL
CO2 SERPL-SCNC: 24 MMOL/L (ref 21–32)
CREAT SERPL-MCNC: 1.06 MG/DL (ref 0.7–1.3)
EOSINOPHIL NFR BLD: 6.1 % (ref 0–6)
ERYTHROCYTE [DISTWIDTH] IN BLOOD BY AUTOMATED COUNT: 11.7 % (ref 0–15.5)
ESTIMATED AVG GLUCOSE: 101 MG/DL
GFR ESTIMATION: > 60
GLUCOSE SERPL-MCNC: 106 MG/DL (ref 74–106)
HBA1C MFR BLD: 5 % (ref 4.2–6.3)
HCT VFR BLD AUTO: 44.7 % (ref 42–52)
HDLC SERPL-MCNC: 44 MG/DL
HGB BLD-MCNC: 15.7 G/DL (ref 14–18)
IMMATURE GRANULOCYTES: 0.5 % (ref 0–0.43)
LDLC SERPL CALC-MCNC: 132.6 MG/DL
LYMPHOCYTES NFR BLD: 26.7 % (ref 20–45)
MCH RBC QN AUTO: 31 PG (ref 27–32)
MCHC RBC AUTO-ENTMCNC: 35.1 % (ref 32–36)
MCV RBC AUTO: 88.3 FL (ref 80–97)
MONOCYTES NFR BLD: 7.2 % (ref 2–10)
NEUTROPHILS # BLD AUTO: 3.8 10*3/UL (ref 1.4–7)
NEUTROPHILS NFR BLD: 58.3 % (ref 50–80)
NUCLEATED RED BLOOD CELLS: 0 % (ref 0–0.2)
PLATELETS: 231 10*3/UL (ref 130–400)
PMV BLD AUTO: 10.8 FL (ref 9.2–12.2)
POTASSIUM SERPL-SCNC: 4.2 MMOL/L (ref 3.5–5.1)
PROT SERPL-MCNC: 7.2 G/DL (ref 6.4–8.2)
RBC # BLD AUTO: 5.06 10*6/UL (ref 4.7–6.1)
SODIUM BLD-SCNC: 136 MMOL/L (ref 131–143)
T4 FREE SP9 P CHAL SERPL-SCNC: 0.96 NG/DL (ref 0.76–1.46)
TRIGL SERPL-MCNC: 102 MG/DL (ref 0–149)
TSH SERPL DL<=0.005 MIU/L-ACNC: 1.68 UIU/ML (ref 0.36–3.74)
VLDL CHOLESTEROL: 20 MG/DL
WBC # BLD: 6.6 10*3/UL (ref 4.5–10)

## 2023-08-09 PROCEDURE — 1159F PR MEDICATION LIST DOCUMENTED IN MEDICAL RECORD: ICD-10-PCS | Mod: CPTII,S$GLB,, | Performed by: INTERNAL MEDICINE

## 2023-08-09 PROCEDURE — 3079F PR MOST RECENT DIASTOLIC BLOOD PRESSURE 80-89 MM HG: ICD-10-PCS | Mod: CPTII,S$GLB,, | Performed by: INTERNAL MEDICINE

## 2023-08-09 PROCEDURE — 3074F PR MOST RECENT SYSTOLIC BLOOD PRESSURE < 130 MM HG: ICD-10-PCS | Mod: CPTII,S$GLB,, | Performed by: INTERNAL MEDICINE

## 2023-08-09 PROCEDURE — 3008F BODY MASS INDEX DOCD: CPT | Mod: CPTII,S$GLB,, | Performed by: INTERNAL MEDICINE

## 2023-08-09 PROCEDURE — 3008F PR BODY MASS INDEX (BMI) DOCUMENTED: ICD-10-PCS | Mod: CPTII,S$GLB,, | Performed by: INTERNAL MEDICINE

## 2023-08-09 PROCEDURE — 1159F MED LIST DOCD IN RCRD: CPT | Mod: CPTII,S$GLB,, | Performed by: INTERNAL MEDICINE

## 2023-08-09 PROCEDURE — 1160F RVW MEDS BY RX/DR IN RCRD: CPT | Mod: CPTII,S$GLB,, | Performed by: INTERNAL MEDICINE

## 2023-08-09 PROCEDURE — 1160F PR REVIEW ALL MEDS BY PRESCRIBER/CLIN PHARMACIST DOCUMENTED: ICD-10-PCS | Mod: CPTII,S$GLB,, | Performed by: INTERNAL MEDICINE

## 2023-08-09 PROCEDURE — 99215 OFFICE O/P EST HI 40 MIN: CPT | Mod: S$GLB,,, | Performed by: INTERNAL MEDICINE

## 2023-08-09 PROCEDURE — 3074F SYST BP LT 130 MM HG: CPT | Mod: CPTII,S$GLB,, | Performed by: INTERNAL MEDICINE

## 2023-08-09 PROCEDURE — 99215 PR OFFICE/OUTPT VISIT, EST, LEVL V, 40-54 MIN: ICD-10-PCS | Mod: S$GLB,,, | Performed by: INTERNAL MEDICINE

## 2023-08-09 PROCEDURE — 3079F DIAST BP 80-89 MM HG: CPT | Mod: CPTII,S$GLB,, | Performed by: INTERNAL MEDICINE

## 2023-08-09 RX ORDER — PROPRANOLOL HYDROCHLORIDE 40 MG/1
40 TABLET ORAL
COMMUNITY
Start: 2023-07-11

## 2023-08-09 RX ORDER — RISPERIDONE 2 MG/1
TABLET ORAL
COMMUNITY
Start: 2023-08-07

## 2023-08-09 RX ORDER — BENZTROPINE MESYLATE 1 MG/1
TABLET ORAL
COMMUNITY
Start: 2023-08-07

## 2023-08-09 RX ORDER — FLUTICASONE PROPIONATE 50 MCG
2 SPRAY, SUSPENSION (ML) NASAL DAILY PRN
Qty: 16 G | Refills: 2 | Status: SHIPPED | OUTPATIENT
Start: 2023-08-09 | End: 2023-11-07

## 2023-08-09 RX ORDER — LEVOCETIRIZINE DIHYDROCHLORIDE 5 MG/1
5 TABLET, FILM COATED ORAL NIGHTLY PRN
Qty: 30 TABLET | Refills: 3 | Status: SHIPPED | OUTPATIENT
Start: 2023-08-09

## 2023-08-09 RX ORDER — LORAZEPAM 1 MG/1
TABLET ORAL
COMMUNITY
Start: 2023-07-09

## 2023-08-09 RX ORDER — TRAZODONE HYDROCHLORIDE 150 MG/1
TABLET ORAL
COMMUNITY
Start: 2023-08-07

## 2023-08-09 NOTE — PROGRESS NOTES
"  Subjective:       Patient ID: Evaristo Crooks is a 42 y.o. male.    Chief Complaint: Establish Care (Former pt. Of Summer Mejias,NP to est care.  C/o Lt. Breast "lump"/painful x 4 months, decreasing in size.  )    HPI    42 y.o. male with Active Diagnosis Review (HCC)     Chronic             HCC 59 - Major Depressive, Bipolar, and Paranoid Disorders     Bipolar affective disorder, remission status unspecified [F31.9]          Suspect             HCC 57 - Schizophrenia     Chronic schizoaffective disorder with acute exacerbation [F25.9]     HCC 56 - Substance Use Disorder, Mild, Except Alcohol and Cannabis     Substance abuse [F19.10]           here to establish care. He is present with his mother, Chacha, today who has POA.     Dr. Godoy - apt 9/18/23 - psych   Occasionally sees Dr. Montenegro for urgent care needs at [facility] in [place].    Health Maintenance Topics with due status: Not Due       Topic Last Completion Date    TETANUS VACCINE 01/01/2020    Lipid Panel 07/13/2021    Influenza Vaccine 11/09/2021       Health Maintenance Due   Topic Date Due    Hemoglobin A1c (Diabetic Prevention Screening)  Never done    COVID-19 Vaccine (3 - Moderna series) 06/22/2021         Medical Problems:      Patient Active Problem List   Diagnosis    Breast lesion    Environmental allergies    Chronic schizoaffective disorder with acute exacerbation    Substance abuse       Current Outpatient Medications on File Prior to Visit   Medication Sig Dispense Refill    benztropine (COGENTIN) 1 MG tablet       DULoxetine (CYMBALTA) 30 MG capsule Take 1 capsule (30 mg total) by mouth once daily. 30 capsule 2    LORazepam (ATIVAN) 1 MG tablet TAKE 1 TABLET BY MOUTH AS NEEDED FOR ANXIETY /AGITATION      MULTIVITAMIN ORAL Take by mouth once daily.      propranoloL (INDERAL) 40 MG tablet Take 40 mg by mouth.      risperiDONE (RISPERDAL) 2 MG tablet       traZODone (DESYREL) 150 MG tablet       [DISCONTINUED] diphenhydrAMINE (BENADRYL) 50 " MG tablet Take 50 mg by mouth nightly as needed for Itching.      [DISCONTINUED] fexofenadine (ALLEGRA) 60 MG tablet Take 1 tablet (60 mg total) by mouth once daily. 90 tablet 1    [DISCONTINUED] fluphenazine (PROLIXIN) 10 MG tablet       [DISCONTINUED] fluPHENAZine decanoate (PROLIXIN) 25 mg/mL injection Inject 50 mg into the muscle every 30 days.      [DISCONTINUED] gabapentin (NEURONTIN) 300 MG capsule TAKE 2 CAPSULES(600 MG) BY MOUTH THREE TIMES DAILY 180 capsule 1    [DISCONTINUED] OLANZapine (ZYPREXA) 20 MG tablet Take 1.5 tablets by mouth every evening.      [DISCONTINUED] propranoloL (INDERAL LA) 60 MG 24 hr capsule TAKE 1 CAPSULE(60 MG) BY MOUTH EVERY EVENING 30 capsule 2    [DISCONTINUED] traZODone (DESYREL) 100 MG tablet TAKE 1 TABLET(100 MG) BY MOUTH EVERY EVENING 30 tablet 2     No current facility-administered medications on file prior to visit.           Past Medical History:   Diagnosis Date    Acute renal insufficiency     ADHD (attention deficit hyperactivity disorder)     Anxiety     Bipolar disorder     Chronic schizoaffective disorder with acute exacerbation     Depression     Drug-induced psychotic disorder     Elevated CK     Polysubstance (excluding opioids) dependence, binge pattern     Polysubstance (excluding opioids) dependence, daily use     Substance or medication-induced psychotic disorder      Past Surgical History:   Procedure Laterality Date    HERNIA REPAIR       Family History   Problem Relation Age of Onset    Breast cancer Mother     Depression Mother     Alcohol abuse Father      Social History     Socioeconomic History    Marital status: Single   Tobacco Use    Smoking status: Former     Current packs/day: 0.00     Types: Cigars     Passive exposure: Past    Smokeless tobacco: Never   Substance and Sexual Activity    Alcohol use: Never    Drug use: Never     Review of patient's allergies indicates:   Allergen Reactions    Bupropion      Pt snorts medication to get high     "Chantix [varenicline]        Current Outpatient Medications:     benztropine (COGENTIN) 1 MG tablet, , Disp: , Rfl:     DULoxetine (CYMBALTA) 30 MG capsule, Take 1 capsule (30 mg total) by mouth once daily., Disp: 30 capsule, Rfl: 2    LORazepam (ATIVAN) 1 MG tablet, TAKE 1 TABLET BY MOUTH AS NEEDED FOR ANXIETY /AGITATION, Disp: , Rfl:     MULTIVITAMIN ORAL, Take by mouth once daily., Disp: , Rfl:     propranoloL (INDERAL) 40 MG tablet, Take 40 mg by mouth., Disp: , Rfl:     risperiDONE (RISPERDAL) 2 MG tablet, , Disp: , Rfl:     traZODone (DESYREL) 150 MG tablet, , Disp: , Rfl:     fluticasone propionate (FLONASE) 50 mcg/actuation nasal spray, 2 sprays (100 mcg total) by Each Nostril route daily as needed for Rhinitis or Allergies., Disp: 16 g, Rfl: 2    levocetirizine (XYZAL) 5 MG tablet, Take 1 tablet (5 mg total) by mouth nightly as needed for Allergies., Disp: 30 tablet, Rfl: 3        Review of Systems   Constitutional:  Negative for diaphoresis and fever.   HENT:  Negative for trouble swallowing.    Respiratory:  Negative for cough and shortness of breath.    Cardiovascular:  Negative for chest pain and leg swelling.   Gastrointestinal:  Negative for abdominal pain.   Genitourinary:  Negative for difficulty urinating.   Musculoskeletal:  Negative for gait problem.        + intermittent painful chest wall/left breast lump x 3-4 months - resolves without intervention; denies trauma or inciting event as well as known trigger for flares. Denies warmth, erythema, drainage   Skin:  Negative for pallor.   Allergic/Immunologic: Positive for environmental allergies.   Neurological:  Negative for syncope and weakness.       Objective:        Vitals:    08/09/23 1025   BP: 120/80   BP Location: Left arm   Patient Position: Sitting   BP Method: Large (Manual)   Pulse: (!) 55   Resp: 15   Temp: 97.3 °F (36.3 °C)   TempSrc: Temporal   SpO2: 98%   Weight: 85.9 kg (189 lb 6.4 oz)   Height: 5' 8" (1.727 m)       Body mass " index is 28.8 kg/m².    Physical Exam  Constitutional:       General: He is not in acute distress.     Appearance: He is well-developed. He is not diaphoretic.   HENT:      Head: Normocephalic and atraumatic.      Right Ear: External ear normal.      Left Ear: External ear normal.   Cardiovascular:      Rate and Rhythm: Normal rate and regular rhythm.   Pulmonary:      Effort: Pulmonary effort is normal.      Breath sounds: Normal breath sounds.   Chest:      Chest wall: No mass, swelling or tenderness.   Breasts:     Left: No swelling, inverted nipple, mass, nipple discharge, skin change or tenderness.   Abdominal:      General: Bowel sounds are normal.      Palpations: Abdomen is soft.   Musculoskeletal:      Cervical back: Neck supple.      Right lower leg: No edema.      Left lower leg: No edema.   Skin:     General: Skin is warm and dry.      Nails: There is no clubbing.   Neurological:      General: No focal deficit present.      Mental Status: He is alert.   Psychiatric:         Mood and Affect: Affect is flat.         Behavior: Behavior normal.         Assessment:     1. Encounter to establish care    2. Screening for cardiovascular condition    3. Other long term (current) drug therapy    4. Breast lesion    5. Environmental allergies    6. Chronic schizoaffective disorder with acute exacerbation    7. Substance abuse           Plan:         1. Encounter to establish care  - reviewed healthcare maintenance and pt's chronic medical problems.   - labs reviewed, ordered  - immunizations reviewed  - CBC Auto Differential; Future  - Comprehensive Metabolic Panel; Future  - Hemoglobin A1C; Future  - Lipid Panel; Future  - TSH; Future  - T4, Free; Future  - T4, Free  - TSH  - Lipid Panel  - Hemoglobin A1C  - Comprehensive Metabolic Panel  - CBC Auto Differential    2. Screening for cardiovascular condition    - Lipid Panel; Future  - Lipid Panel    3. Other long term (current) drug therapy    - CBC Auto  Differential; Future  - Comprehensive Metabolic Panel; Future  - Hemoglobin A1C; Future  - Lipid Panel; Future  - TSH; Future  - T4, Free; Future  - T4, Free  - TSH  - Lipid Panel  - Hemoglobin A1C  - Comprehensive Metabolic Panel  - CBC Auto Differential    4. Breast lesion  - pt denies symptoms currently and physical exam unremarkable. Given intermittent symptoms with sporadic presentation and resolution, will proceed w/ US order for imaging evaluation. Pt and his mother instructed to call imaging center to schedule prn if lump recurs. Of note, pt's mother w/ h/o breast cancer  - US Breast Left Limited; Future    5. Environmental allergies  - discussed management. Rx sent  - fluticasone propionate (FLONASE) 50 mcg/actuation nasal spray; 2 sprays (100 mcg total) by Each Nostril route daily as needed for Rhinitis or Allergies.  Dispense: 16 g; Refill: 2  - levocetirizine (XYZAL) 5 MG tablet; Take 1 tablet (5 mg total) by mouth nightly as needed for Allergies.  Dispense: 30 tablet; Refill: 3    6. Chronic schizoaffective disorder with acute exacerbation  - chronic, managed by psychiatrist Dr. Godoy    7. Substance abuse  - chronic, managed by psychiatrist Dr. Godoy              Unless there are intervening problems, Follow up in about 1 year (around 8/9/2024), or if symptoms worsen or fail to improve, for annual.      Patient note was created using MModal Dictation.  Any errors in syntax or even information may not have been identified and edited on initial review prior to signing this note.    I spent a total of 40 minutes on the day of the visit.        This includes face to face time and non-face to face time preparing to see the patient (eg, review of tests), obtaining and/or reviewing separately obtained history, documenting clinical information in the electronic or other health record, independently interpreting results and communicating results to the patient/family/caregiver, or care coordinator.

## 2023-08-10 ENCOUNTER — TELEPHONE (OUTPATIENT)
Dept: FAMILY MEDICINE | Facility: CLINIC | Age: 43
End: 2023-08-10
Payer: MEDICARE

## 2023-08-10 NOTE — PROGRESS NOTES
Please call patient with test results: (pt does not use portal, can we deactivate?) patient's mom would also like copy of results mailed to them so they can give to psychiatrist  Your blood count is normal- no anemia. Electrolytes, liver and kidney function are all within good range. Cholesterol is in good range- improved from 2 years ago. Blood sugar is also within normal limits so no evidence of diabetes. The thyroid hormone is in good range.

## 2023-08-10 NOTE — TELEPHONE ENCOUNTER
----- Message from Nataliya Azul MD sent at 8/10/2023  8:02 AM CDT -----  Please call patient with test results: (pt does not use portal, can we deactivate?) patient's mom would also like copy of results mailed to them so they can give to psychiatrist  Your blood count is normal- no anemia. Electrolytes, liver and kidney function are all within good range. Cholesterol is in good range- improved from 2 years ago. Blood sugar is also within normal limits so no evidence of diabetes. The thyroid hormone is in good range.

## 2023-08-10 NOTE — TELEPHONE ENCOUNTER
Informed patients mom os his lab work. Also told her that we are mailing the results to her so she can send to [facility]. Patient verbalized understanding

## 2023-11-05 DIAGNOSIS — Z91.09 ENVIRONMENTAL ALLERGIES: ICD-10-CM

## 2023-11-07 RX ORDER — FLUTICASONE PROPIONATE 50 MCG
SPRAY, SUSPENSION (ML) NASAL
Qty: 16 G | Refills: 2 | Status: SHIPPED | OUTPATIENT
Start: 2023-11-07

## 2024-04-02 ENCOUNTER — OFFICE VISIT (OUTPATIENT)
Dept: URGENT CARE | Facility: CLINIC | Age: 44
End: 2024-04-02
Payer: MEDICARE

## 2024-04-02 VITALS
RESPIRATION RATE: 16 BRPM | HEART RATE: 77 BPM | DIASTOLIC BLOOD PRESSURE: 84 MMHG | SYSTOLIC BLOOD PRESSURE: 125 MMHG | OXYGEN SATURATION: 98 % | TEMPERATURE: 98 F | WEIGHT: 180 LBS | HEIGHT: 68 IN | BODY MASS INDEX: 27.28 KG/M2

## 2024-04-02 DIAGNOSIS — J34.89 NOSE PAIN: ICD-10-CM

## 2024-04-02 DIAGNOSIS — R05.9 COUGH, UNSPECIFIED TYPE: Primary | ICD-10-CM

## 2024-04-02 DIAGNOSIS — R09.82 POST-NASAL DRIP: ICD-10-CM

## 2024-04-02 PROCEDURE — 99213 OFFICE O/P EST LOW 20 MIN: CPT | Mod: S$GLB,,,

## 2024-04-02 RX ORDER — PROMETHAZINE HYDROCHLORIDE AND DEXTROMETHORPHAN HYDROBROMIDE 6.25; 15 MG/5ML; MG/5ML
5 SYRUP ORAL EVERY 4 HOURS PRN
Qty: 240 ML | Refills: 0 | Status: SHIPPED | OUTPATIENT
Start: 2024-04-02 | End: 2024-04-12

## 2024-04-02 NOTE — PROGRESS NOTES
"Subjective:      Patient ID: Evaristo Crooks is a 43 y.o. male.    Vitals:  height is 5' 8" (1.727 m) and weight is 81.6 kg (180 lb). His oral temperature is 97.7 °F (36.5 °C). His blood pressure is 125/84 and his pulse is 77. His respiration is 16 and oxygen saturation is 98%.     Chief Complaint: Cough    Patient presents today with a cough that he has had for the past 3 weeks. Patient is also reporting a chemical smell when he breathes and that his nose lining has thickened.     Cough  This is a new problem. The current episode started 1 to 4 weeks ago. The problem has been unchanged. The problem occurs hourly. The cough is Productive of sputum. Associated symptoms include postnasal drip. Pertinent negatives include no fever, nasal congestion or sore throat. Nothing aggravates the symptoms. He has tried nothing for the symptoms. The treatment provided no relief.       Constitution: Negative for fever.   HENT:  Positive for postnasal drip. Negative for sore throat.    Respiratory:  Positive for cough.     Objective:     Physical Exam    Assessment:     1. Cough, unspecified type      2. Nose pain      3. Post-nasal drip        Plan:     Patient Instructions   Take medications as prescribed  Tylenol for pain    Please follow up with your primary care provider within 2-5 days if your signs and symptoms have not resolved or worsen.         If your condition worsens or fails to improve we recommend that you receive another evaluation at the emergency room immediately or contact your primary medical clinic to discuss your concerns.   You must understand that you have received an Urgent Care treatment only and that you may be released before all of your medical problems are known or treated. You, the patient, will arrange for follow up care as instructed.       There are no diagnoses linked to this encounter.      Medical Decision Making:   Urgent Care Management:  Pt presents to  with c/o dry cough and feels like he " can't get mucous up.  States his insides feel like they are moving in the opposite direction and constantly smells chemicals.  Concerned with yellow mucous coming out of his nose and states his nose hurts.  States symptoms have been going on for 3 weeks.  Pt has hx of getting high on medications (snorting bupropion).  Nasal mucosa appears pink and moist, no lesions.  Breathing is even and unlabored with breath sounds present in all lobes.  No adventitious sounds.  Pt given cough medication and advised to RTC if symptoms worsen or do not improve.  Pt verbalized understanding

## 2024-04-02 NOTE — PATIENT INSTRUCTIONS
Take medications as prescribed  Tylenol for pain    Please follow up with your primary care provider within 2-5 days if your signs and symptoms have not resolved or worsen.         If your condition worsens or fails to improve we recommend that you receive another evaluation at the emergency room immediately or contact your primary medical clinic to discuss your concerns.   You must understand that you have received an Urgent Care treatment only and that you may be released before all of your medical problems are known or treated. You, the patient, will arrange for follow up care as instructed.

## 2024-11-08 ENCOUNTER — OFFICE VISIT (OUTPATIENT)
Dept: URGENT CARE | Facility: CLINIC | Age: 44
End: 2024-11-08
Payer: MEDICARE

## 2024-11-08 VITALS
TEMPERATURE: 98 F | DIASTOLIC BLOOD PRESSURE: 81 MMHG | BODY MASS INDEX: 27.28 KG/M2 | OXYGEN SATURATION: 98 % | HEART RATE: 55 BPM | HEIGHT: 68 IN | WEIGHT: 180 LBS | SYSTOLIC BLOOD PRESSURE: 127 MMHG | RESPIRATION RATE: 16 BRPM

## 2024-11-08 DIAGNOSIS — M62.542 ATROPHY OF MUSCLE OF BOTH HANDS: Primary | ICD-10-CM

## 2024-11-08 DIAGNOSIS — R20.9 ALTERATION IN TACTILE SENSE: ICD-10-CM

## 2024-11-08 DIAGNOSIS — M62.541 ATROPHY OF MUSCLE OF BOTH HANDS: Primary | ICD-10-CM

## 2024-11-08 DIAGNOSIS — F22: ICD-10-CM

## 2024-11-08 DIAGNOSIS — F25.9 CHRONIC SCHIZOAFFECTIVE DISORDER WITH ACUTE EXACERBATION: ICD-10-CM

## 2024-11-08 PROCEDURE — 99214 OFFICE O/P EST MOD 30 MIN: CPT | Mod: S$GLB,,, | Performed by: NURSE PRACTITIONER

## 2024-11-08 RX ORDER — AMITRIPTYLINE HYDROCHLORIDE 50 MG/1
50 TABLET, FILM COATED ORAL NIGHTLY
COMMUNITY
Start: 2024-10-18

## 2024-11-08 RX ORDER — PAROXETINE 10 MG/1
10 TABLET, FILM COATED ORAL
COMMUNITY
Start: 2024-10-18

## 2024-11-08 RX ORDER — ALPRAZOLAM 0.5 MG/1
TABLET ORAL
COMMUNITY
Start: 2024-10-18

## 2024-11-08 NOTE — PROGRESS NOTES
"Subjective:      Patient ID: Evaristo Crooks is a 44 y.o. male.    Vitals:  height is 5' 8" (1.727 m) and weight is 81.6 kg (180 lb). His oral temperature is 97.5 °F (36.4 °C). His blood pressure is 127/81 and his pulse is 55 (abnormal). His respiration is 16 and oxygen saturation is 98%.     Chief Complaint: No chief complaint on file.    Patient complaints: works as a  at a local restaurant and his hands have gotten smaller since he started his job 7 months ago  -taking ibuprofen  -states that he also has felt like something is wrong with the environment at work. 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 He tries to physically push it away by swinging his arms around and then it goes away. His co-workers look at him differently when he pushes the thing that is touching him away. States he occasionally feels a strange feeling "in my chest but its like on the inside".   States he stopped taking his Invega, Amitriptyline, and Paxil because he thought these medications were causing the above mentioned sensations. Admits he is only taking Wellbutrin and Alprazolam at this time.  He is under the psychiatric care of Dr Godoy at [facility]  -needs an work excuse and a referral to a hand doctor        Other  Pertinent negatives include no arthralgias, joint swelling or rash.       Musculoskeletal:  Negative for pain, trauma, joint pain, joint swelling and abnormal ROM of joint.   Skin:  Negative for color change, rash, wound, erythema and bruising.   Neurological:  Negative for disorientation.   Psychiatric/Behavioral:  Positive for history of mental illness. Negative for disorientation, confusion, nervous/anxious and hallucinations. The patient is not nervous/anxious.       Objective:     Physical Exam   Constitutional: He is oriented to person, place, and time.   HENT:   Head: Normocephalic and atraumatic.   Mouth/Throat: Mucous membranes are moist.   Cardiovascular: Normal rate and " normal pulses.   Pulmonary/Chest: Effort normal.   Musculoskeletal: Normal range of motion.         General: No swelling, tenderness, deformity or signs of injury. Normal range of motion.   Neurological: He is alert and oriented to person, place, and time.   Skin: Skin is warm and dry. No erythema   Psychiatric: He experiences Auditory hallucinations and Visual hallucinations. Memory and judgment normal. His mood appears anxious. His speech is tangential. He is hyperactive. He is not agitated and not aggressive. Thought content is paranoid and delusional. Cognition normalHe expresses no homicidal and no suicidal ideation. He expresses no suicidal plans and no homicidal plans.      Comments: Pt seems to be responding to internal stimuli; observed him shaking his head and whispering to a non-existent being in the exam room.  +delusions of paranoia   Thinks he is experiencing a medical condition causing shrinkage of his hands and is firmly requesting a referral to an orthopedic specialist for evaluation.  No evidence of abnormalities to his hands.  He denies A/V hallucinations.  Requires frequent re-direction as he frequently refers to    Nursing note and vitals reviewed.      Assessment:     1. Atrophy of muscle of both hands    2. Chronic schizoaffective disorder with acute exacerbation    3. Alteration in thought content as evidenced by delusions    4. Alteration in tactile sense        Plan:       Atrophy of muscle of both hands    Chronic schizoaffective disorder with acute exacerbation    Alteration in thought content as evidenced by delusions    Alteration in tactile sense          Medical Decision Making:   Urgent Care Management:  Pt obviously responding to internal stimuli during exam.  Paranoid delusions  Medication noncompliance    He is not an immediate danger to himself or others, therefore he is deemed safe for discharge. I strongly encouraged him to take all of his prescribed medications as directed. I  attempted to contact Dr Godoy, his psychiatrist, however the office is closed for the day. I left a message with the answering services requesting a return call to clinic on Monday and also requested that the office reach out to pt to schedule a close follow up for medication management.  I provided pt with instructions to call 911 or go to [facility] ER for psych eval if he developed any thoughts of harm to self/others and/or his delusions/hallucinations become persecutory, bothersome, or dangerous.  He verbalized understanding and was given information to [facility] ER.         Patient Instructions   Please follow up with your primary care provider within 2-5 days if your signs and symptoms have not resolved or worsen.     If your condition worsens or fails to improve we recommend that you receive another evaluation at the emergency room immediately or contact your primary medical clinic to discuss your concerns.   You must understand that you have received an Urgent Care treatment only and that you may be released before all of your medical problems are known or treated. You, the patient, will arrange for follow up care as instructed.     Please take all of your prescribed medications as directed by your psychiatrist.  Please follow up with Dr Godoy early next week to discuss your medications and perceptual disturbances.  If you develop any worsening of your condition including hallucinations or thoughts of harm toward yourself or others, please go immediately to [facility] ER for psychiatric evaluation. Alternatively, you may call 911 for ambulance escort.

## 2024-11-08 NOTE — LETTER
November 8, 2024      [facility]  [address] [place] [address] [place] [address]  Phone: [phone]  Fax: [phone]       Patient: Evaristo Crooks   YOB: 1980  Date of Visit: 11/08/2024    To Whom It May Concern:    Quinton Crooks  was at [facility] on 11/08/2024. The patient may return to work on 11/13/2024 with no restrictions. If you have any questions or concerns, or if I can be of further assistance, please do not hesitate to contact me.    Sincerely,    Love Rubio NP

## 2024-11-08 NOTE — PATIENT INSTRUCTIONS
Please follow up with your primary care provider within 2-5 days if your signs and symptoms have not resolved or worsen.     If your condition worsens or fails to improve we recommend that you receive another evaluation at the emergency room immediately or contact your primary medical clinic to discuss your concerns.   You must understand that you have received an Urgent Care treatment only and that you may be released before all of your medical problems are known or treated. You, the patient, will arrange for follow up care as instructed.     Please take all of your prescribed medications as directed by your psychiatrist.  Please follow up with Dr Godoy early next week to discuss your medications and perceptual disturbances.  If you develop any worsening of your condition including hallucinations or thoughts of harm toward yourself or others, please go immediately to [facility] ER for psychiatric evaluation. Alternatively, you may call 911 for ambulance escort.